# Patient Record
Sex: FEMALE | Race: WHITE | NOT HISPANIC OR LATINO | URBAN - METROPOLITAN AREA
[De-identification: names, ages, dates, MRNs, and addresses within clinical notes are randomized per-mention and may not be internally consistent; named-entity substitution may affect disease eponyms.]

---

## 2019-08-20 ENCOUNTER — APPOINTMENT (OUTPATIENT)
Dept: WOMENS IMAGING | Facility: HOSPITAL | Age: 44
End: 2019-08-20

## 2019-08-20 PROCEDURE — 77067 SCR MAMMO BI INCL CAD: CPT | Performed by: RADIOLOGY

## 2022-04-14 RX ORDER — SERTRALINE HYDROCHLORIDE 100 MG/1
100 TABLET, FILM COATED ORAL DAILY
Qty: 90 TABLET | Refills: 0 | Status: SHIPPED | OUTPATIENT
Start: 2022-04-14 | End: 2022-06-24

## 2022-04-14 RX ORDER — SPIRONOLACTONE 25 MG/1
25 TABLET ORAL DAILY
COMMUNITY
End: 2022-04-14 | Stop reason: SDUPTHER

## 2022-04-14 RX ORDER — SPIRONOLACTONE 25 MG/1
25 TABLET ORAL DAILY
Qty: 90 TABLET | Refills: 0 | Status: SHIPPED | OUTPATIENT
Start: 2022-04-14 | End: 2022-06-24

## 2022-04-14 RX ORDER — TRAZODONE HYDROCHLORIDE 50 MG/1
TABLET ORAL
Qty: 60 TABLET | Refills: 0 | Status: SHIPPED | OUTPATIENT
Start: 2022-04-14 | End: 2022-06-02 | Stop reason: SDUPTHER

## 2022-04-14 RX ORDER — SERTRALINE HYDROCHLORIDE 100 MG/1
100 TABLET, FILM COATED ORAL DAILY
COMMUNITY
Start: 2022-02-20 | End: 2022-04-14 | Stop reason: SDUPTHER

## 2022-04-14 RX ORDER — TRAZODONE HYDROCHLORIDE 50 MG/1
TABLET ORAL
COMMUNITY
Start: 2022-03-19 | End: 2022-04-14 | Stop reason: SDUPTHER

## 2022-04-14 NOTE — TELEPHONE ENCOUNTER
Incoming Refill Request      Medication requested (name and dose):     SPIRONOLACTONE 25MG TABLETS    Pharmacy where request should be sent:     [facility]    Additional details provided by patient:     NONE    Best call back number:     [phone]    Does the patient have less than a 3 day supply:  [] Yes  [x] No    Emy Osuna  04/14/22, 10:03 EDT

## 2022-05-16 ENCOUNTER — TELEPHONE (OUTPATIENT)
Dept: FAMILY MEDICINE CLINIC | Facility: CLINIC | Age: 47
End: 2022-05-16

## 2022-05-16 NOTE — TELEPHONE ENCOUNTER
Incoming Refill Request      Medication requested (name and dose):     TRAZODONE 50MG TABLETS    Pharmacy where request should be sent:     [facility]    Additional details provided by patient:     NONE    Best call back number:     [phone]    Does the patient have less than a 3 day supply:  [] Yes  [x] No    Emy Osuna  05/16/22, 11:14 EDT

## 2022-06-02 RX ORDER — TRAZODONE HYDROCHLORIDE 50 MG/1
TABLET ORAL
Qty: 60 TABLET | Refills: 1 | Status: SHIPPED | OUTPATIENT
Start: 2022-06-02 | End: 2022-06-24

## 2022-06-24 ENCOUNTER — TELEMEDICINE (OUTPATIENT)
Dept: FAMILY MEDICINE CLINIC | Facility: CLINIC | Age: 47
End: 2022-06-24

## 2022-06-24 VITALS — WEIGHT: 141 LBS | HEIGHT: 65 IN | BODY MASS INDEX: 23.49 KG/M2

## 2022-06-24 DIAGNOSIS — Z13.220 SCREENING CHOLESTEROL LEVEL: ICD-10-CM

## 2022-06-24 DIAGNOSIS — F33.1 MODERATE EPISODE OF RECURRENT MAJOR DEPRESSIVE DISORDER: ICD-10-CM

## 2022-06-24 DIAGNOSIS — G47.00 PERSISTENT INSOMNIA: Primary | ICD-10-CM

## 2022-06-24 DIAGNOSIS — R53.82 CHRONIC FATIGUE: ICD-10-CM

## 2022-06-24 PROCEDURE — 99214 OFFICE O/P EST MOD 30 MIN: CPT | Performed by: PHYSICIAN ASSISTANT

## 2022-06-24 RX ORDER — TRAZODONE HYDROCHLORIDE 100 MG/1
TABLET ORAL
Qty: 90 TABLET | Refills: 3 | Status: SHIPPED | OUTPATIENT
Start: 2022-06-24

## 2022-06-24 RX ORDER — SERTRALINE HYDROCHLORIDE 100 MG/1
150 TABLET, FILM COATED ORAL DAILY
Qty: 90 TABLET | Refills: 0 | Status: SHIPPED | OUTPATIENT
Start: 2022-06-24 | End: 2022-10-13

## 2022-06-24 NOTE — ASSESSMENT & PLAN NOTE
Patient's depression is recurrent and is mild without psychosis. Their depression is currently in partial remission and the condition is worsening. This will be reassessed at the next regular appointment. F/U as described:patient was prescribed an antidepressant medicine and the dosage was increased at today's visit.

## 2022-06-24 NOTE — ASSESSMENT & PLAN NOTE
Patient has been taking 2 of her 50 mg trazodone for sleep I am going to increase her dosage 100 mg tablet and new prescription is sent to the pharmacy.

## 2022-06-24 NOTE — PROGRESS NOTES
"Chief Complaint  Insomnia (Patient needs a refill on her med.)    Subjective  Patient was seen today through synchronous audio/video technology. Verbal consent was obtained. The patient was located at home. Vitals signs were not obtained due to lack of home monitoring access. Time spent with patient was 20 minutes.        Leilani Nguyen presents to [facility]  History of Present Illness is being seen today for a refill of her her trazodone for sleep and she reports that she is now taking 100 mg at bedtime for sleep.  She also is reporting that her depression is not adequately controlled she is somewhat better but not back to her baseline.  Much of her depression has to do with her 's underlying mental health issues since his bariatric  surgery complications.     Objective   Vital Signs:   Ht 165.1 cm (65\")   Wt 64 kg (141 lb) Comment: Patient reported vitals  BMI 23.46 kg/m²     Body mass index is 23.46 kg/m².    Review of Systems   Constitutional: Positive for fatigue. Negative for chills and fever.   Respiratory: Negative.  Negative for cough, shortness of breath and wheezing.    Cardiovascular: Negative.    Gastrointestinal: Negative for constipation, diarrhea, nausea and vomiting.   Neurological: Negative for dizziness.   Psychiatric/Behavioral: Positive for depressed mood and stress.       Past History:  Medical History: has a past medical history of Appendicitis (1995), Depressive disorder in remission, Edema of lower extremity, Generalized anxiety disorder, Metrorrhagia, Ovarian cyst, Pregnancy (2002), Primary insomnia, and Raynaud's phenomenon.   Surgical History: has a past surgical history that includes Endometrial ablation and Appendectomy.   Family History: family history includes Anxiety disorder in her mother; Depression in her mother; Diabetes in her father and mother; Hypertension in her father and mother.   Social History: reports that she quit smoking about 5 " years ago. Her smoking use included cigarettes. She has a 25.00 pack-year smoking history. She has never used smokeless tobacco. She reports previous alcohol use. She reports that she does not use drugs.      Current Outpatient Medications:   •  sertraline (ZOLOFT) 100 MG tablet, Take 1.5 tablets by mouth Daily., Disp: 90 tablet, Rfl: 0  •  traZODone (DESYREL) 100 MG tablet, Take 1 to 2 tablets as needed at bed time for sleep., Disp: 90 tablet, Rfl: 3  Allergies: Patient has no known allergies.    Physical Exam  Constitutional:       Appearance: Normal appearance.   Neurological:      Mental Status: She is alert and oriented to person, place, and time.   Psychiatric:         Mood and Affect: Mood normal.         Behavior: Behavior normal.             Assessment and Plan   Diagnoses and all orders for this visit:    1. Persistent insomnia (Primary)  Assessment & Plan:  Patient has been taking 2 of her 50 mg trazodone for sleep I am going to increase her dosage 100 mg tablet and new prescription is sent to the pharmacy.      2. Moderate episode of recurrent major depressive disorder (HCC)  Assessment & Plan:  Patient's depression is recurrent and is mild without psychosis. Their depression is currently in partial remission and the condition is worsening. This will be reassessed at the next regular appointment. F/U as described:patient was prescribed an antidepressant medicine and the dosage was increased at today's visit.      3. Chronic fatigue  -     CBC & Differential; Future  -     Comprehensive Metabolic Panel; Future  -     TSH; Future    4. Screening cholesterol level  Assessment & Plan:  Routine screening labs ordered. Further recommendations will depend upon those results.     Orders:  -     Lipid Panel; Future    Other orders  -     sertraline (ZOLOFT) 100 MG tablet; Take 1.5 tablets by mouth Daily.  Dispense: 90 tablet; Refill: 0  -     traZODone (DESYREL) 100 MG tablet; Take 1 to 2 tablets as needed at bed  time for sleep.  Dispense: 90 tablet; Refill: 3          Follow Up   No follow-ups on file.  Patient was given instructions and counseling regarding her condition or for health maintenance advice. Please see specific information pulled into the AVS if appropriate.     Miladys Barney PA-C

## 2022-07-13 RX ORDER — SPIRONOLACTONE 25 MG/1
TABLET ORAL
Qty: 90 TABLET | Refills: 1 | Status: SHIPPED | OUTPATIENT
Start: 2022-07-13

## 2022-10-13 RX ORDER — SERTRALINE HYDROCHLORIDE 100 MG/1
TABLET, FILM COATED ORAL
Qty: 90 TABLET | Refills: 1 | Status: SHIPPED | OUTPATIENT
Start: 2022-10-13 | End: 2023-02-06

## 2023-01-11 RX ORDER — SPIRONOLACTONE 25 MG/1
TABLET ORAL
Qty: 90 TABLET | Refills: 1 | OUTPATIENT
Start: 2023-01-11

## 2023-02-06 RX ORDER — SERTRALINE HYDROCHLORIDE 100 MG/1
TABLET, FILM COATED ORAL
Qty: 90 TABLET | Refills: 1 | Status: SHIPPED | OUTPATIENT
Start: 2023-02-06

## 2023-06-12 ENCOUNTER — TELEPHONE (OUTPATIENT)
Dept: FAMILY MEDICINE CLINIC | Facility: CLINIC | Age: 48
End: 2023-06-12

## 2023-06-12 RX ORDER — SERTRALINE HYDROCHLORIDE 100 MG/1
150 TABLET, FILM COATED ORAL DAILY
Qty: 90 TABLET | Refills: 1 | Status: SHIPPED | OUTPATIENT
Start: 2023-06-12

## 2023-06-12 NOTE — TELEPHONE ENCOUNTER
Caller: Leilani Nguyen    Relationship: Self    Best call back number: [phone]     Requested Prescriptions:   Requested Prescriptions     Pending Prescriptions Disp Refills    sertraline (ZOLOFT) 100 MG tablet 90 tablet 1     Sig: Take 1.5 tablets by mouth Daily.        Pharmacy where request should be sent:      Last office visit with prescribing clinician: Visit date not found   Last telemedicine visit with prescribing clinician: Visit date not found   Next office visit with prescribing clinician: 7/3/2023     Additional details provided by patient: PATIENT HAS AROUND 3 DAYS REMAINING.    Does the patient have less than a 3 day supply:  [x] Yes  [] No    Would you like a call back once the refill request has been completed: [] Yes [x] No    If the office needs to give you a call back, can they leave a voicemail: [] Yes [x] No    Edgardo Yao Rep   06/12/23 11:39 EDT

## 2023-06-12 NOTE — TELEPHONE ENCOUNTER
Caller: Leilani Nguyen    Relationship: Self    Best call back number: [phone]     What orders are you requesting (i.e. lab or imaging): LAB    In what timeframe would the patient need to come in: WEEK BEFORE JULY 3RD APPOINTMENT    Where will you receive your lab/imaging services: HOSPITAL    Additional notes: PATIENT WANTS TO GET BLOOD WORK DONE BEFORE HER APPOINTMENT SO [facility] HAS HER RESULTS AT IT.

## 2023-06-12 NOTE — TELEPHONE ENCOUNTER
Left Message: Returning Pts call, we can not put order in for Lab work prior to appt. Pt must be seen first.

## 2023-06-12 NOTE — TELEPHONE ENCOUNTER
Pt Contacted: Returning Pts call, we can not put order in for Lab work prior to appt. Pt must be seen first

## 2023-06-12 NOTE — TELEPHONE ENCOUNTER
Caller: Leilani Nguyen    Relationship: Self    Best call back number: [phone]    What is the best time to reach you: ANYTIME    Who are you requesting to speak with (clinical staff, provider,  specific staff member): CLINICAL STAFF    Do you know the name of the person who called: ANGELA    What was the call regarding: RETURNING PHONE CALL    Is it okay if the provider responds through MyChart: N/A